# Patient Record
Sex: MALE
[De-identification: names, ages, dates, MRNs, and addresses within clinical notes are randomized per-mention and may not be internally consistent; named-entity substitution may affect disease eponyms.]

---

## 2019-12-19 PROBLEM — Z00.00 ENCOUNTER FOR PREVENTIVE HEALTH EXAMINATION: Status: ACTIVE | Noted: 2019-12-19

## 2020-01-02 ENCOUNTER — APPOINTMENT (OUTPATIENT)
Dept: NEUROLOGY | Facility: CLINIC | Age: 75
End: 2020-01-02

## 2020-02-13 ENCOUNTER — APPOINTMENT (OUTPATIENT)
Dept: NEUROLOGY | Facility: CLINIC | Age: 75
End: 2020-02-13
Payer: MEDICARE

## 2020-02-13 VITALS
WEIGHT: 118 LBS | BODY MASS INDEX: 20.91 KG/M2 | SYSTOLIC BLOOD PRESSURE: 173 MMHG | HEART RATE: 74 BPM | HEIGHT: 63 IN | TEMPERATURE: 98.3 F | OXYGEN SATURATION: 99 % | DIASTOLIC BLOOD PRESSURE: 76 MMHG

## 2020-02-13 DIAGNOSIS — G20 PARKINSON'S DISEASE: ICD-10-CM

## 2020-02-13 PROCEDURE — 99205 OFFICE O/P NEW HI 60 MIN: CPT

## 2020-02-13 RX ORDER — CARBIDOPA AND LEVODOPA 25; 100 MG/1; MG/1
25-100 TABLET ORAL 3 TIMES DAILY
Qty: 270 | Refills: 1 | Status: ACTIVE | COMMUNITY
Start: 1900-01-01 | End: 1900-01-01

## 2020-02-13 NOTE — DISCUSSION/SUMMARY
[FreeTextEntry1] : Tremor dominant PD whose tremor appears to not respond much to levodopa or artane. In addition, he has developed hallucinations on higher doses of medication per his records. The severity of his tremor and low side effect profile are reasons to consider him for DBS. I discussed the indications, surgery and postop management of DBS with him and his family. \par \par We decided to discontinue artane due to his memory issues and raise sineemt o 1 tab TID. He is planning to go to [place] for 1-3 months and will schedule neuropsych and CAPSIT when he returns. He should continue seroquel 25mg qhs since we will raise levodopa modestly to see if any tremor control is achieved.

## 2020-02-13 NOTE — HISTORY OF PRESENT ILLNESS
[FreeTextEntry1] : Patient is referred by Dr. Hammond. Phone  used\par \par He was diagnosed with PD 3-4 years ago. His initial symptom was tremor on t the right hand. He has been on levodopa for a few years with no considerable improvement in his tremor. Per his records he used to take c/l ER 50/200 TID which reportedly caused hallucinations and had to be lowered. He currently takes 1 tab of sinemet 25/100 with 1 tab artane daily. He was hospitalized in December for unclear reason and was started on seroquel qhs. He no longer has hallucinations but notes vivid dreams\par  He states his tremor affects his ability to eat and write. Denies any gait or balance issues\par \par Nonmotor\par +constipation\par + RBD\par \par FH - older brother - OPD \par Meds\par sinemet 25/100 1 tab qd\par artane 2mg 1 tab qd\par seroquel 25mg qhs\par namenda 5mg qhs

## 2020-02-13 NOTE — PHYSICAL EXAM
[FreeTextEntry1] : Patient has 2+ masking. There is moderate rest tremor of the right arm with mild spread to the right leg. There is mild right greater than left bradykinesia.and trace rigidity. Walks with mild stooped posture . SL are age appropriate. Right hand tremors when walking. Postural reflexes intact

## 2020-07-09 ENCOUNTER — APPOINTMENT (OUTPATIENT)
Dept: NEUROLOGY | Facility: CLINIC | Age: 75
End: 2020-07-09

## 2021-01-19 ENCOUNTER — APPOINTMENT (OUTPATIENT)
Dept: NEUROLOGY | Facility: CLINIC | Age: 76
End: 2021-01-19